# Patient Record
Sex: FEMALE | Race: WHITE | NOT HISPANIC OR LATINO
[De-identification: names, ages, dates, MRNs, and addresses within clinical notes are randomized per-mention and may not be internally consistent; named-entity substitution may affect disease eponyms.]

---

## 2018-07-31 PROBLEM — Z00.00 ENCOUNTER FOR PREVENTIVE HEALTH EXAMINATION: Status: ACTIVE | Noted: 2018-07-31

## 2018-08-08 ENCOUNTER — APPOINTMENT (OUTPATIENT)
Dept: PULMONOLOGY | Facility: CLINIC | Age: 28
End: 2018-08-08
Payer: COMMERCIAL

## 2018-08-08 VITALS
OXYGEN SATURATION: 99 % | TEMPERATURE: 99.1 F | HEIGHT: 65 IN | BODY MASS INDEX: 29.99 KG/M2 | WEIGHT: 180 LBS | SYSTOLIC BLOOD PRESSURE: 119 MMHG | DIASTOLIC BLOOD PRESSURE: 80 MMHG | HEART RATE: 86 BPM | RESPIRATION RATE: 20 BRPM

## 2018-08-08 DIAGNOSIS — G47.33 OBSTRUCTIVE SLEEP APNEA (ADULT) (PEDIATRIC): ICD-10-CM

## 2018-08-08 PROCEDURE — 94010 BREATHING CAPACITY TEST: CPT

## 2018-08-08 PROCEDURE — 99204 OFFICE O/P NEW MOD 45 MIN: CPT | Mod: 25

## 2018-08-22 ENCOUNTER — OUTPATIENT (OUTPATIENT)
Dept: OUTPATIENT SERVICES | Facility: HOSPITAL | Age: 28
LOS: 1 days | End: 2018-08-22
Payer: COMMERCIAL

## 2018-08-22 DIAGNOSIS — G47.30 SLEEP APNEA, UNSPECIFIED: ICD-10-CM

## 2018-08-22 PROCEDURE — 95806 SLEEP STUDY UNATT&RESP EFFT: CPT | Mod: 26

## 2018-08-22 PROCEDURE — 95806 SLEEP STUDY UNATT&RESP EFFT: CPT

## 2018-08-22 PROCEDURE — G0399: CPT

## 2021-09-07 ENCOUNTER — APPOINTMENT (OUTPATIENT)
Dept: ENDOCRINOLOGY | Facility: CLINIC | Age: 31
End: 2021-09-07
Payer: COMMERCIAL

## 2021-09-07 VITALS
WEIGHT: 215 LBS | DIASTOLIC BLOOD PRESSURE: 68 MMHG | OXYGEN SATURATION: 99 % | SYSTOLIC BLOOD PRESSURE: 118 MMHG | BODY MASS INDEX: 35.82 KG/M2 | HEART RATE: 98 BPM | HEIGHT: 65 IN

## 2021-09-07 PROCEDURE — 99204 OFFICE O/P NEW MOD 45 MIN: CPT | Mod: 25

## 2021-09-07 PROCEDURE — 98960 EDU&TRN PT SELF-MGMT NQHP 1: CPT

## 2021-09-07 PROCEDURE — G0108 DIAB MANAGE TRN  PER INDIV: CPT

## 2021-09-07 RX ORDER — CHROMIUM 200 MCG
TABLET ORAL
Refills: 0 | Status: ACTIVE | COMMUNITY

## 2021-09-07 RX ORDER — HYDROXYPROGESTERONE CAPROATE 250 MG/ML
275 INJECTION SUBCUTANEOUS
Refills: 0 | Status: ACTIVE | COMMUNITY

## 2021-09-07 NOTE — CONSULT LETTER
[Dear  ___] : Dear ~RICK, [Consult Letter:] : I had the pleasure of evaluating your patient, [unfilled]. [Consult Closing:] : Thank you very much for allowing me to participate in the care of this patient.  If you have any questions, please do not hesitate to contact me. [Sincerely,] : Sincerely, [FreeTextEntry1] : At the visit with myself and our dietician, we explained impaired glucose tolerance and gestational diabetes in detail giving her printed information as well. We placed her on a gestational diabetic diet restricting her carbohydrate intake to 15 grams at breakfast. She was given a sample diet and instructed on carbohydrate counting. We instructed her on the use of a blood glucose monitor and she was asked to monitor her blood sugars before breakfast and one hour after each meal. \par \par If her blood sugars remain in our target goal of 90 or less fasting and below 120 post- prandially, we will continue to follow her closely on diet management. If, however, her blood sugars rise above these numbers, we will, of course, institute drug therapy.\par \par We have asked her to contact us with her blood glucose numbers in two to three days and to schedule an office follow-up in several weeks. \par \par  [FreeTextEntry3] : Emily Lieberman, FNP-BC

## 2021-09-07 NOTE — ASSESSMENT
[FreeTextEntry1] : 30 y/o female G2G1 with GDM. \par \par Plan:\par GDM: \par - reviewed gestational diabetes risks to patient and fetus\par - start self blood sugar monitoring 4 times a day\par - blood sugar goal for fasting <90 and <120 1 hour meals \par - discussed the potential use of insulin if blood sugars are over goals\par - nursing will perform insulin education \par - send in logs in 3 days\par - will meet with CDE today, to review GDM diet\par - monitor urine ketones\par - check A1C and thyroid function test now\par - follow up visit in 2 weeks\par

## 2021-09-07 NOTE — PHYSICAL EXAM
[Alert] : alert [Well Nourished] : well nourished [No Acute Distress] : no acute distress [Well Developed] : well developed [Normal Sclera/Conjunctiva] : normal sclera/conjunctiva [No Proptosis] : no proptosis [No LAD] : no lymphadenopathy [Thyroid Not Enlarged] : the thyroid was not enlarged [No Thyroid Nodules] : no palpable thyroid nodules [No Respiratory Distress] : no respiratory distress [No Accessory Muscle Use] : no accessory muscle use [Normal Rate and Effort] : normal respiratory rate and effort [Clear to Auscultation] : lungs were clear to auscultation bilaterally [Normal S1, S2] : normal S1 and S2 [Normal Rate] : heart rate was normal [Regular Rhythm] : with a regular rhythm [No Edema] : no peripheral edema [Normal Bowel Sounds] : normal bowel sounds [Normal Gait] : normal gait [No Rash] : no rash [No Tremors] : no tremors [Oriented x3] : oriented to person, place, and time [Normal Affect] : the affect was normal [Normal Insight/Judgement] : insight and judgment were intact [Normal Mood] : the mood was normal [Acanthosis Nigricans] : no acanthosis nigricans [de-identified] : gravid

## 2021-09-07 NOTE — REVIEW OF SYSTEMS
[Fatigue] : fatigue [Recent Weight Gain (___ Lbs)] : recent weight gain: [unfilled] lbs [Constipation] : constipation [Headaches] : headaches [Anxiety] : anxiety [Decreased Appetite] : appetite not decreased [Visual Field Defect] : no visual field defect [Blurred Vision] : no blurred vision [Dysphagia] : no dysphagia [Neck Pain] : no neck pain [Dysphonia] : no dysphonia [Chest Pain] : no chest pain [Palpitations] : no palpitations [Diarrhea] : no diarrhea [Polyuria] : no polyuria [Dysuria] : no dysuria [Tremors] : no tremors [Depression] : no depression [Polydipsia] : no polydipsia [Swelling] : no swelling [FreeTextEntry3] : pain the right with headache [FreeTextEntry7] : sometimes  [de-identified] : dry mouth

## 2021-09-07 NOTE — HISTORY OF PRESENT ILLNESS
[FreeTextEntry1] : Quality:  GDM \par Severity: moderate \par Onset: 27 weeks gestation \par Glucose tolerance test results: 2 hour gtt \par History of PCOS: Yes, was treated with Metformin\par Previous history of GDM: No, 1st child took Metformin \par Family History: Possibly biological father with diabetes\par \par Self blood sugar monitoring: none \par current \par \par \par Notes: Obstetrician: [facility] \par  IVF with same sex partner with partner egg  \par Continues to receive Progesterone injections weekly \par \par 1st child 2020 weighed 6 lbs 2 oz, vaginal birth (boy)\par LMP: 21\par EDC: 2021\par currently 27.5 weeks gestation\par Last sonogram: a couple weeks: normal (boy)\par \par Planning to breast feed: Yes\par \par \par PMH: Fibromyalgia

## 2021-09-09 RX ORDER — LANCETS 30 GAUGE
EACH MISCELLANEOUS
Qty: 400 | Refills: 1 | Status: ACTIVE | COMMUNITY
Start: 2021-09-09 | End: 1900-01-01

## 2021-09-09 RX ORDER — BLOOD SUGAR DIAGNOSTIC
STRIP MISCELLANEOUS
Qty: 4 | Refills: 1 | Status: ACTIVE | COMMUNITY
Start: 2021-09-09 | End: 1900-01-01

## 2021-09-10 RX ORDER — BLOOD SUGAR DIAGNOSTIC
STRIP MISCELLANEOUS
Qty: 400 | Refills: 0 | Status: ACTIVE | COMMUNITY
Start: 2021-09-10 | End: 1900-01-01

## 2021-09-10 RX ORDER — LANCETS 28 GAUGE
EACH MISCELLANEOUS
Qty: 4 | Refills: 0 | Status: ACTIVE | COMMUNITY
Start: 2021-09-10 | End: 1900-01-01

## 2021-09-10 RX ORDER — BLOOD-GLUCOSE METER
W/DEVICE KIT MISCELLANEOUS
Qty: 1 | Refills: 0 | Status: ACTIVE | COMMUNITY
Start: 2021-09-10 | End: 1900-01-01

## 2021-09-16 ENCOUNTER — APPOINTMENT (OUTPATIENT)
Dept: ENDOCRINOLOGY | Facility: CLINIC | Age: 31
End: 2021-09-16
Payer: COMMERCIAL

## 2021-09-16 PROCEDURE — 99214 OFFICE O/P EST MOD 30 MIN: CPT | Mod: 95

## 2021-09-16 NOTE — HISTORY OF PRESENT ILLNESS
[Home] : at home, [unfilled] , at the time of the visit. [Medical Office: ([facility])___] : at the medical office located in  [Verbal consent obtained from patient] : the patient, [unfilled] [FreeTextEntry1] : Quality:  GDM \par Severity: moderate \par Onset: 27 weeks gestation \par Glucose tolerance test results: 2 hour gtt \par History of PCOS: Yes, was treated with Metformin\par Previous history of GDM: No, 1st child took Metformin \par Family History: Possibly biological father with diabetes\par \par Self blood sugar monitorin times a day\par before breakfast 85, 88, 87, 74, 97, 52, 97\par after breakfast 129, 144 (2 eggs and 1.5 protein waffles), 104, 100, 102, 122, 114\par after lunch 123, 112, 121, 123, 132, 87, 151(chicken wrap with light sour cream) 118\par after dinner 112, 131, 127, 94, 104, 112, 106\par this morning 89\par Ketones moderate and large \par \par \par Notes: Obstetrician: [facility] \par  IVF with same sex partner with partner egg  \par Continues to receive Progesterone injections weekly \par \par 1st child 2020 weighed 6 lbs 2 oz, vaginal birth (boy)\par LMP: 21\par EDC: 2021\par currently 29 weeks gestation\par Last sonogram: last night: weighed 3 lbs 10 oz, 80th percentile, fluid 13 (boy)\par \par Planning to breast feed: Yes\par \par \par PMH: Fibromyalgia

## 2021-09-16 NOTE — ASSESSMENT
[FreeTextEntry1] : 30 y/o female G2G1 with GDM. \par \par Plan:\par GDM: \par - start Humalog to 3 units before lunch \par - increase carbs at bedtime to prevent urine ketones in morning \par - continue self blood sugar monitoring 4 times a day\par - blood sugar goal for fasting <90 and <120 1 hour meals \par - send in logs in on Monday \par - monitor urine ketones\par - follow up visit in 1 week\par \par \par Start Time: 3:30\par End Time: 4:00

## 2021-09-21 ENCOUNTER — NON-APPOINTMENT (OUTPATIENT)
Age: 31
End: 2021-09-21

## 2021-09-21 RX ORDER — PEN NEEDLE, DIABETIC 29 G X1/2"
32G X 4 MM NEEDLE, DISPOSABLE MISCELLANEOUS
Qty: 4 | Refills: 0 | Status: ACTIVE | COMMUNITY
Start: 2021-09-17 | End: 1900-01-01

## 2021-09-24 ENCOUNTER — APPOINTMENT (OUTPATIENT)
Dept: ENDOCRINOLOGY | Facility: CLINIC | Age: 31
End: 2021-09-24
Payer: COMMERCIAL

## 2021-09-24 PROCEDURE — 99213 OFFICE O/P EST LOW 20 MIN: CPT | Mod: 95

## 2021-09-24 RX ORDER — PROGESTERONE 50 MG/ML
50 INJECTION, SOLUTION INTRAMUSCULAR
Qty: 20 | Refills: 0 | Status: ACTIVE | COMMUNITY
Start: 2021-02-22

## 2021-09-24 RX ORDER — PEN NEEDLE, DIABETIC 29 G X1/2"
32G X 4 MM NEEDLE, DISPOSABLE MISCELLANEOUS
Qty: 2 | Refills: 1 | Status: ACTIVE | COMMUNITY
Start: 2021-09-24 | End: 1900-01-01

## 2021-09-24 RX ORDER — INSULIN HUMAN 100 [IU]/ML
100 INJECTION, SUSPENSION SUBCUTANEOUS AT BEDTIME
Qty: 1 | Refills: 0 | Status: ACTIVE | COMMUNITY
Start: 2021-09-24 | End: 1900-01-01

## 2021-09-24 NOTE — HISTORY OF PRESENT ILLNESS
[Medical Office: ([facility])___] : at the medical office located in  [Verbal consent obtained from patient] : the patient, [unfilled] [Other Location: e.g. School (Enter Location, City,State)___] : at [unfilled], at the time of the visit. [FreeTextEntry1] : Quality:  GDM \par Severity: moderate \par Onset: 27 weeks gestation \par Glucose tolerance test results: 2 hour gtt \par History of PCOS: Yes, was treated with Metformin\par Previous history of GDM: No, 1st child took Metformin \par Family History: Possibly biological father with diabetes\par \par Current Medication Regimen: \par Humalog 3 units before lunch \par \par Self blood sugar monitorin times a day\par before breakfast 98, 62, 91, 89\par after breakfast 110, 118, 118, 122, 99\par after lunch 127, 113, 124, 87, 100, 113, 111\par after dinner 130, 89, 107, 126\par this morning 96\par Ketones small to trace\par \par \par Notes: Obstetrician: [facility] \par  IVF with same sex partner with partner egg  \par Continues to receive Progesterone injections weekly \par \par 1st child 2020 weighed 6 lbs 2 oz, vaginal birth (boy)\par LMP: 21\par EDC: 2021\par currently 30 weeks gestation\par Last sonogram: last night: weighed 3 lbs 10 oz, 80th percentile, fluid 13 (boy)\par \par Planning to breast feed: Yes\par \par \par PMH: Fibromyalgia

## 2021-09-24 NOTE — REVIEW OF SYSTEMS
[Recent Weight Loss (___ Lbs)] : recent weight loss: [unfilled] lbs [Fatigue] : no fatigue [Decreased Appetite] : appetite not decreased [Recent Weight Gain (___ Lbs)] : no recent weight gain [Visual Field Defect] : no visual field defect [Blurred Vision] : no blurred vision [Dysphagia] : no dysphagia [Neck Pain] : no neck pain [Dysphonia] : no dysphonia [Chest Pain] : no chest pain [Constipation] : no constipation [Palpitations] : no palpitations [Diarrhea] : no diarrhea [Polyuria] : no polyuria [Dysuria] : no dysuria [Polydipsia] : no polydipsia [FreeTextEntry2] : w

## 2021-09-24 NOTE — ASSESSMENT
[FreeTextEntry1] : 32 y/o female G2G1 with GDM. \par \par Plan:\par GDM: \par - start Humulin N 3 units at bedtime \par - continue Humalog 3 units before lunch\par - continue carbs at bedtime to prevent urine ketones in morning \par - continue self blood sugar monitoring 4 times a day\par - blood sugar goal for fasting <90 and <120 1 hour meals \par - send in logs in on Monday \par - monitor urine ketones\par - follow up visit in 2 week\par \par \par Start Time: 3:34\par End Time: 3:54

## 2021-09-27 ENCOUNTER — EMERGENCY (EMERGENCY)
Facility: HOSPITAL | Age: 31
LOS: 1 days | Discharge: HOME | End: 2021-09-27
Attending: EMERGENCY MEDICINE
Payer: COMMERCIAL

## 2021-09-27 ENCOUNTER — OUTPATIENT (OUTPATIENT)
Dept: OUTPATIENT SERVICES | Facility: HOSPITAL | Age: 31
LOS: 1 days | End: 2021-09-27
Payer: COMMERCIAL

## 2021-09-27 VITALS — HEART RATE: 95 BPM | DIASTOLIC BLOOD PRESSURE: 69 MMHG | SYSTOLIC BLOOD PRESSURE: 108 MMHG

## 2021-09-27 VITALS
TEMPERATURE: 99 F | SYSTOLIC BLOOD PRESSURE: 113 MMHG | OXYGEN SATURATION: 98 % | HEIGHT: 65 IN | DIASTOLIC BLOOD PRESSURE: 75 MMHG | HEART RATE: 97 BPM | RESPIRATION RATE: 16 BRPM | WEIGHT: 214.07 LBS

## 2021-09-27 VITALS — HEART RATE: 92 BPM | OXYGEN SATURATION: 99 %

## 2021-09-27 DIAGNOSIS — O26.899 OTHER SPECIFIED PREGNANCY RELATED CONDITIONS, UNSPECIFIED TRIMESTER: ICD-10-CM

## 2021-09-27 DIAGNOSIS — Z3A.00 WEEKS OF GESTATION OF PREGNANCY NOT SPECIFIED: ICD-10-CM

## 2021-09-27 DIAGNOSIS — Z98.890 OTHER SPECIFIED POSTPROCEDURAL STATES: Chronic | ICD-10-CM

## 2021-09-27 LAB
ANION GAP SERPL CALC-SCNC: 15 MMOL/L — SIGNIFICANT CHANGE UP (ref 5–17)
APPEARANCE UR: CLEAR — SIGNIFICANT CHANGE UP
BASOPHILS # BLD AUTO: 0.04 K/UL — SIGNIFICANT CHANGE UP (ref 0–0.2)
BASOPHILS NFR BLD AUTO: 0.5 % — SIGNIFICANT CHANGE UP (ref 0–2)
BILIRUB UR-MCNC: NEGATIVE — SIGNIFICANT CHANGE UP
BUN SERPL-MCNC: 9 MG/DL — SIGNIFICANT CHANGE UP (ref 7–23)
CALCIUM SERPL-MCNC: 8.6 MG/DL — SIGNIFICANT CHANGE UP (ref 8.4–10.5)
CHLORIDE SERPL-SCNC: 105 MMOL/L — SIGNIFICANT CHANGE UP (ref 96–108)
CO2 SERPL-SCNC: 18 MMOL/L — LOW (ref 22–31)
COLOR SPEC: COLORLESS — SIGNIFICANT CHANGE UP
CREAT SERPL-MCNC: 0.41 MG/DL — LOW (ref 0.5–1.3)
DIFF PNL FLD: NEGATIVE — SIGNIFICANT CHANGE UP
EOSINOPHIL # BLD AUTO: 0.08 K/UL — SIGNIFICANT CHANGE UP (ref 0–0.5)
EOSINOPHIL NFR BLD AUTO: 0.9 % — SIGNIFICANT CHANGE UP (ref 0–6)
GLUCOSE BLDC GLUCOMTR-MCNC: 72 MG/DL — SIGNIFICANT CHANGE UP (ref 70–99)
GLUCOSE SERPL-MCNC: 65 MG/DL — LOW (ref 70–99)
GLUCOSE UR QL: NEGATIVE — SIGNIFICANT CHANGE UP
HCT VFR BLD CALC: 38.4 % — SIGNIFICANT CHANGE UP (ref 34.5–45)
HGB BLD-MCNC: 12.8 G/DL — SIGNIFICANT CHANGE UP (ref 11.5–15.5)
IMM GRANULOCYTES NFR BLD AUTO: 0.5 % — SIGNIFICANT CHANGE UP (ref 0–1.5)
KETONES UR-MCNC: NEGATIVE — SIGNIFICANT CHANGE UP
LEUKOCYTE ESTERASE UR-ACNC: ABNORMAL
LYMPHOCYTES # BLD AUTO: 1.35 K/UL — SIGNIFICANT CHANGE UP (ref 1–3.3)
LYMPHOCYTES # BLD AUTO: 15.7 % — SIGNIFICANT CHANGE UP (ref 13–44)
MAGNESIUM SERPL-MCNC: 2.1 MG/DL — SIGNIFICANT CHANGE UP (ref 1.6–2.6)
MCHC RBC-ENTMCNC: 28.7 PG — SIGNIFICANT CHANGE UP (ref 27–34)
MCHC RBC-ENTMCNC: 33.3 GM/DL — SIGNIFICANT CHANGE UP (ref 32–36)
MCV RBC AUTO: 86.1 FL — SIGNIFICANT CHANGE UP (ref 80–100)
MONOCYTES # BLD AUTO: 0.59 K/UL — SIGNIFICANT CHANGE UP (ref 0–0.9)
MONOCYTES NFR BLD AUTO: 6.9 % — SIGNIFICANT CHANGE UP (ref 2–14)
NEUTROPHILS # BLD AUTO: 6.5 K/UL — SIGNIFICANT CHANGE UP (ref 1.8–7.4)
NEUTROPHILS NFR BLD AUTO: 75.5 % — SIGNIFICANT CHANGE UP (ref 43–77)
NITRITE UR-MCNC: NEGATIVE — SIGNIFICANT CHANGE UP
NRBC # BLD: 0 /100 WBCS — SIGNIFICANT CHANGE UP (ref 0–0)
PH UR: 7 — SIGNIFICANT CHANGE UP (ref 5–8)
PHOSPHATE SERPL-MCNC: 2.7 MG/DL — SIGNIFICANT CHANGE UP (ref 2.5–4.5)
PLATELET # BLD AUTO: 294 K/UL — SIGNIFICANT CHANGE UP (ref 150–400)
POTASSIUM SERPL-MCNC: 3.7 MMOL/L — SIGNIFICANT CHANGE UP (ref 3.5–5.3)
POTASSIUM SERPL-SCNC: 3.7 MMOL/L — SIGNIFICANT CHANGE UP (ref 3.5–5.3)
PROT UR-MCNC: NEGATIVE — SIGNIFICANT CHANGE UP
RBC # BLD: 4.46 M/UL — SIGNIFICANT CHANGE UP (ref 3.8–5.2)
RBC # FLD: 13.2 % — SIGNIFICANT CHANGE UP (ref 10.3–14.5)
SODIUM SERPL-SCNC: 138 MMOL/L — SIGNIFICANT CHANGE UP (ref 135–145)
SP GR SPEC: 1.01 — LOW (ref 1.01–1.02)
UROBILINOGEN FLD QL: NEGATIVE — SIGNIFICANT CHANGE UP
WBC # BLD: 8.6 K/UL — SIGNIFICANT CHANGE UP (ref 3.8–10.5)
WBC # FLD AUTO: 8.6 K/UL — SIGNIFICANT CHANGE UP (ref 3.8–10.5)

## 2021-09-27 PROCEDURE — 99284 EMERGENCY DEPT VISIT MOD MDM: CPT

## 2021-09-27 PROCEDURE — 82962 GLUCOSE BLOOD TEST: CPT

## 2021-09-27 PROCEDURE — 99213 OFFICE O/P EST LOW 20 MIN: CPT

## 2021-09-27 PROCEDURE — 83735 ASSAY OF MAGNESIUM: CPT

## 2021-09-27 PROCEDURE — 84100 ASSAY OF PHOSPHORUS: CPT

## 2021-09-27 PROCEDURE — 99283 EMERGENCY DEPT VISIT LOW MDM: CPT

## 2021-09-27 PROCEDURE — 87077 CULTURE AEROBIC IDENTIFY: CPT

## 2021-09-27 PROCEDURE — 81001 URINALYSIS AUTO W/SCOPE: CPT

## 2021-09-27 PROCEDURE — 59025 FETAL NON-STRESS TEST: CPT

## 2021-09-27 PROCEDURE — 87086 URINE CULTURE/COLONY COUNT: CPT

## 2021-09-27 PROCEDURE — 80048 BASIC METABOLIC PNL TOTAL CA: CPT

## 2021-09-27 PROCEDURE — G0463: CPT

## 2021-09-27 PROCEDURE — 85025 COMPLETE CBC W/AUTO DIFF WBC: CPT

## 2021-09-27 RX ORDER — NITROFURANTOIN MACROCRYSTAL 50 MG
1 CAPSULE ORAL
Qty: 14 | Refills: 0
Start: 2021-09-27 | End: 2021-10-03

## 2021-09-27 NOTE — OB PROVIDER TRIAGE NOTE - NSHPPHYSICALEXAM_GEN_ALL_CORE
Vital Signs Last 24 Hrs  T(C): 37 (27 Sep 2021 12:18), Max: 37.1 (27 Sep 2021 10:56)  T(F): 98.6 (27 Sep 2021 12:18), Max: 98.8 (27 Sep 2021 10:56)  HR: 95 (27 Sep 2021 15:41) (84 - 97)  BP: 108/69 (27 Sep 2021 15:41) (99/55 - 113/75)  RR: 18 (27 Sep 2021 12:18) (16 - 18)  SpO2: 99% (27 Sep 2021 13:55) (98% - 100%)    /mod/+accel/-decel  Dearborn irritable  BSS: VTX, posterior, MVP > 4    Gen: awake, alert, NAD  Chest: nonlabored breathing  Abd: soft, gravid, nontender  : NEFG  LE: nontender  MSK: +5/5 LE strength

## 2021-09-27 NOTE — ED PROVIDER NOTE - ATTENDING CONTRIBUTION TO CARE
RGUJRAL 32yo f hx  currently 30 weeks pregnant with IVF, gestational DM BIB EMS for lower back pain and leg heaviness. States symptoms are similar to her prior pregnancy w  labor. Denies any HA, chest/abd pain. Pt complains of lower back pain with b/l thigh heaviness and numbness. Pt was ambulatory, no bladder or stool incontinence. Denies any trauma or fever, chills. Pt felt lightheaded w symptoms that has since improved. No dysuria, frequency.   On exam, Patient is awake, alert and oriented x 3.  Patient is well appearing and in no acute distress.  NCAT, PERRL, EOMI.  Neck is supple, No LAD.  Lungs are CTA B/L,+S1S2 no murmurs,  Abdomen:Soft gravid non tender +bs no rebound or guarding.  No midline T/L tenderness.   Extremity no edema or calf tender.  Skin with no rash.  Neuro CN3-12 intact. Strength 5/5 in upper and lower extremities. Nml Sensation.Gait normal.   Pt denies any vaginal bleeding or leakage of fluids. Pt neuro intact, concern for  labor. Spoke to OB resident and Charge RN, will transport patient directly to L and D.

## 2021-09-27 NOTE — ED PROVIDER NOTE - OBJECTIVE STATEMENT
31 y.o. female  20 weeks pregnant had pre ter labor with her prior pregnancy and is currently being monitored for that with this pregnancy getting injections to prevent it ocming in feeling lightheaded, having lower back pain and numbness in her lower extremities.  NO belly pain, no contractions.  NO cough , no fevers, no chills.  No weakness in LE's, no dysuria, frequency, vaginal bleeding or rush of fluid from the vagina. 31 y.o. female  30 weeks pregnant had pre ter labor with her prior pregnancy and is currently being monitored for that with this pregnancy getting injections to prevent it ocming in feeling lightheaded, having lower back pain and numbness in her lower extremities.  NO belly pain, no contractions.  NO cough , no fevers, no chills.  No weakness in LE's, no dysuria, frequency, vaginal bleeding or rush of fluid from the vagina.

## 2021-09-27 NOTE — OB PROVIDER TRIAGE NOTE - NSOBPROVIDERNOTE_OBGYN_ALL_OB_FT
30yo  @ 30w4d PNC IVF, gDMA2 p/w c/o weakness.   - CBC, BMP, UA  - NST    d/w Dr. Mary Hankins R3 30yo  @ 30w4d PNC IVF, gDMA2 p/w c/o weakness.   - CBC, BMP, UA  - NST    d/w Dr. Mary Hankins R3    @ 1400  - NST reactive: 140/mod/+accel/-decel  - CBC/BMP pending    @1600  - CBC/BMP normal  - UA w/ poss UTI -> send for Cx, empiric Rx w/Macrobid  - c/w FS, f/u w/OB as scheduled  - Return precautions reviewed    d/w Dr. Mary Hankins R3

## 2021-09-27 NOTE — OB PROVIDER TRIAGE NOTE - NS_OBGYNHISTORY_OBGYN_ALL_OB_FT
PNC: [facility]. gDMA2. No reported genetic/sono abnl. EFW 2wk ago 80%    OBHx:  G1 PPROM @ 35w s/p   G2 current    GYNHx: h/o PCOS; denies STIs, abnl Pap smears, fibroids

## 2021-09-27 NOTE — OB PROVIDER TRIAGE NOTE - NSHPLABSRESULTS_GEN_ALL_CORE
12.8   8.60  )-----------( 294      ( 09-27 @ 14:40 )             38.4     09-27 @ 14:40    138  |  105  |  9   ----------------------------<  65  3.7   |  18  |  0.41    Ca    8.6      09-27 @ 14:40  Phos  2.7     09-27 @ 14:40  Mg     2.1     09-27 @ 14:40    CAPILLARY BLOOD GLUCOSE  POCT Blood Glucose.: 72 mg/dL (27 Sep 2021 13:42)  POCT Blood Glucose.: 84 mg/dL (27 Sep 2021 11:09)  POCT Blood Glucose.: 83 mg/dL (27 Sep 2021 10:58)

## 2021-09-28 ENCOUNTER — APPOINTMENT (OUTPATIENT)
Dept: ENDOCRINOLOGY | Facility: CLINIC | Age: 31
End: 2021-09-28
Payer: COMMERCIAL

## 2021-09-28 PROCEDURE — 99212 OFFICE O/P EST SF 10 MIN: CPT | Mod: 95

## 2021-09-28 NOTE — HISTORY OF PRESENT ILLNESS
[FreeTextEntry1] : Pt went to ER yesterday due to fainting at work. She is a  and there was a fire drill, Pt felt like passing out in the hallway and needed to lay on ground. She checked her sugar and it was 90. Ambulance was called and she was in [facility] for observation. She was found to have +UTI. during her ER stay sugar dropped to 60, but did not eat for 6 hours, as they would not let her. Pre-Term labor was ruled out. Earlier in the day she had urinary hesitation. \par Patient has OB visit this afternoon\par \par Quality: GDM \par Severity: moderate \par Onset: 27 weeks gestation \par Glucose tolerance test results: 2 hour gtt \par History of PCOS: Yes, was treated with Metformin\par Previous history of GDM: No, 1st child took Metformin \par Family History: Possibly biological father with diabetes\par \par Current Medication Regimen: \par Humalog 3 units before lunch \par Humulin N 3 units before bed\par \par Self blood sugar monitorin times a day\par \par \par Notes: Obstetrician: [facility] \par  IVF with same sex partner with partner egg \par Continues to receive Progesterone injections weekly \par \par 1st child 2020 weighed 6 lbs 2 oz, vaginal birth (boy)\par LMP: 21\par EDC: 2021\par currently 31 weeks gestation on Thursday \par Last sonogram: last night: weighed 3 lbs 10 oz, 80th percentile, fluid 13 (boy)\par \par Planning to breast feed: Yes\par \par \par PMH: Fibromyalgia

## 2021-09-28 NOTE — ASSESSMENT
[FreeTextEntry1] : \par 30 y/o female G2G1 with GDM. \par \par Plan:\par GDM: \par - Cotninue Humulin N 3 units at bedtime \par - continue Humalog 3 units before lunch\par - continue carbs at bedtime to prevent urine ketones in morning \par - continue self blood sugar monitoring 4 times a day\par - blood sugar goal for fasting <90 and <120 1 hour meals \par - send in logs in on Monday \par - monitor urine ketones\par - follow up visit in 2 week\par -If feels lightheaded before morning snack, may need to increase carbs in breakfast \par \par \par Start Time: 3:26\par End Time: 3:42\par

## 2021-09-28 NOTE — REASON FOR VISIT
[Home] : at home, [unfilled] , at the time of the visit. [Medical Office: ([facility])___] : at the medical office located in  [Verbal consent obtained from patient] : the patient, [unfilled]

## 2021-09-28 NOTE — REVIEW OF SYSTEMS
[Fatigue] : no fatigue [Visual Field Defect] : no visual field defect [Blurred Vision] : no blurred vision [Dysphagia] : no dysphagia [Neck Pain] : no neck pain [Chest Pain] : no chest pain [Palpitations] : no palpitations [Shortness Of Breath] : no shortness of breath [Nausea] : no nausea [Constipation] : no constipation [Vomiting] : no vomiting [Diarrhea] : no diarrhea [Polyuria] : polyuria [Polydipsia] : no polydipsia [FreeTextEntry8] : urine hesistancy

## 2021-09-29 LAB
CULTURE RESULTS: SIGNIFICANT CHANGE UP
SPECIMEN SOURCE: SIGNIFICANT CHANGE UP

## 2021-10-05 RX ORDER — URINE ACETONE TEST STRIPS
STRIP MISCELLANEOUS
Qty: 100 | Refills: 0 | Status: ACTIVE | COMMUNITY
Start: 2021-09-09 | End: 1900-01-01

## 2021-10-11 ENCOUNTER — APPOINTMENT (OUTPATIENT)
Dept: ENDOCRINOLOGY | Facility: CLINIC | Age: 31
End: 2021-10-11
Payer: COMMERCIAL

## 2021-10-11 VITALS
HEART RATE: 94 BPM | BODY MASS INDEX: 35.32 KG/M2 | SYSTOLIC BLOOD PRESSURE: 110 MMHG | HEIGHT: 65 IN | WEIGHT: 212 LBS | OXYGEN SATURATION: 98 % | DIASTOLIC BLOOD PRESSURE: 70 MMHG

## 2021-10-11 PROBLEM — M41.9 SCOLIOSIS, UNSPECIFIED: Chronic | Status: ACTIVE | Noted: 2021-09-27

## 2021-10-11 LAB — GLUCOSE BLDC GLUCOMTR-MCNC: 109

## 2021-10-11 PROCEDURE — 82962 GLUCOSE BLOOD TEST: CPT

## 2021-10-11 PROCEDURE — 99213 OFFICE O/P EST LOW 20 MIN: CPT | Mod: 25

## 2021-10-11 NOTE — ASSESSMENT
[FreeTextEntry1] : 32 y/o female G2G1 with GDM. \par \par Plan:\par GDM: \par - continue current Rx\par - continue carbs at bedtime to prevent urine ketones in the morning \par - continue self blood sugar monitoring 4 times a day\par - blood sugar goal for fasting <90 and <120 1 hour meals \par - send in logs on Thursday\par - monitor urine ketones\par - follow up visit in 2 week\par

## 2021-10-11 NOTE — PHYSICAL EXAM
[Alert] : alert [Well Nourished] : well nourished [No Acute Distress] : no acute distress [Well Developed] : well developed [Normal Sclera/Conjunctiva] : normal sclera/conjunctiva [No Proptosis] : no proptosis [No LAD] : no lymphadenopathy [Thyroid Not Enlarged] : the thyroid was not enlarged [No Thyroid Nodules] : no palpable thyroid nodules [No Respiratory Distress] : no respiratory distress [No Accessory Muscle Use] : no accessory muscle use [Normal Rate and Effort] : normal respiratory rate and effort [Clear to Auscultation] : lungs were clear to auscultation bilaterally [Normal S1, S2] : normal S1 and S2 [Normal Rate] : heart rate was normal [Regular Rhythm] : with a regular rhythm [No Edema] : no peripheral edema [Normal Bowel Sounds] : normal bowel sounds [Normal Gait] : normal gait [No Rash] : no rash [No Tremors] : no tremors [Oriented x3] : oriented to person, place, and time [Normal Affect] : the affect was normal [Normal Insight/Judgement] : insight and judgment were intact [Normal Mood] : the mood was normal [Acanthosis Nigricans] : no acanthosis nigricans [de-identified] : gravid

## 2021-10-11 NOTE — HISTORY OF PRESENT ILLNESS
[FreeTextEntry1] : Quality:  GDM \par Severity: moderate \par Onset: 27 weeks gestation \par Glucose tolerance test results: 2 hour gtt \par History of PCOS: Yes, was treated with Metformin\par Previous history of GDM: No, 1st child took Metformin \par Family History: Possibly biological father with diabetes\par \par Current Medication Regimen: \par Novolog 3 units before lunch \par Humulin N N 3 units at bedtime \par \par Self blood sugar monitorin times a day\par before breakfast  92, 89, 77, 113, 96\par after breakfast 109, 117, 95, 137, 100\par after lunch 93, 98, 111, 119\par after dinner 107,103, 112, 107, 108\par this morning 89\par Ketones Trace\par \par \par Notes: Obstetrician: [facility] \par  IVF with same sex partner with partner egg  \par Continues to receive Progesterone injections weekly \par \par 1st child 2020 weighed 6 lbs 2 oz, vaginal birth (boy)\par LMP: 21\par EDC: 2021\par currently 32  weeks gestation\par Last sonogram: last night: weighed 3 lbs 10 oz, 80th percentile, fluid 13 (boy)\par \par Planning to breast feed: Yes\par \par \par PMH: Fibromyalgia

## 2021-10-11 NOTE — REVIEW OF SYSTEMS
[Constipation] : constipation [Heat Intolerance] : heat intolerance [Negative] : Eyes [Fatigue] : no fatigue [Decreased Appetite] : appetite not decreased [Recent Weight Gain (___ Lbs)] : no recent weight gain [Recent Weight Loss (___ Lbs)] : no recent weight loss [Fever] : no fever [Chills] : no chills [Dysphagia] : no dysphagia [Neck Pain] : no neck pain [Dysphonia] : no dysphonia [Chest Pain] : no chest pain [Palpitations] : no palpitations [Shortness Of Breath] : no shortness of breath [Cough] : no cough [Nausea] : no nausea [Vomiting] : no vomiting [Diarrhea] : no diarrhea [Polyuria] : no polyuria [Dysuria] : no dysuria [Incontinence] : no incontinence [Joint Pain] : no joint pain [Muscle Weakness] : no muscle weakness [Dry Skin] : no dry skin [Hair Loss] : no hair loss [Headaches] : no headaches [Tremors] : no tremors [Anxiety] : no anxiety [Depression] : no depression [Polydipsia] : no polydipsia [Cold Intolerance] : no cold intolerance [Easy Bleeding] : no ~M tendency for easy bleeding [Easy Bruising] : no tendency for easy bruising [de-identified] : pregnancy related

## 2021-11-01 ENCOUNTER — NON-APPOINTMENT (OUTPATIENT)
Age: 31
End: 2021-11-01

## 2021-11-07 ENCOUNTER — RX RENEWAL (OUTPATIENT)
Age: 31
End: 2021-11-07

## 2021-11-07 RX ORDER — INSULIN LISPRO 100 [IU]/ML
100 INJECTION, SOLUTION INTRAVENOUS; SUBCUTANEOUS
Qty: 15 | Refills: 0 | Status: ACTIVE | COMMUNITY
Start: 2021-09-24 | End: 1900-01-01

## 2021-11-09 ENCOUNTER — NON-APPOINTMENT (OUTPATIENT)
Age: 31
End: 2021-11-09

## 2021-11-11 ENCOUNTER — APPOINTMENT (OUTPATIENT)
Dept: ENDOCRINOLOGY | Facility: CLINIC | Age: 31
End: 2021-11-11
Payer: COMMERCIAL

## 2021-11-11 PROCEDURE — 99212 OFFICE O/P EST SF 10 MIN: CPT | Mod: 95

## 2021-11-11 PROCEDURE — 99202 OFFICE O/P NEW SF 15 MIN: CPT | Mod: 95

## 2021-11-11 NOTE — HISTORY OF PRESENT ILLNESS
[Home] : at home, [unfilled] , at the time of the visit. [Medical Office: ([facility])___] : at the medical office located in  [Verbal consent obtained from patient] : the patient, [unfilled] [FreeTextEntry1] : Quality: GDM \par Severity: moderate \par Onset: 27 weeks gestation \par Glucose tolerance test results: 2 hour gtt \par History of PCOS: Yes, was treated with Metformin\par Previous history of GDM: No, 1st child took Metformin \par Family History: Possibly biological father with diabetes\par \par Self blood sugar monitorin times a day\par before breakfast 92, x, 87\par after breakfast 98, 110, 119\par after lunch 107, 130, 126\par after dinner 130, 125\par \par Ketones trace, small, trace\par \par Current doses\par 3 units of Humalog with breakfast\par 6 units of Humalog lunch and dinner\par 5 units of Humulin N \par \par Notes: Obstetrician: [facility] \par  IVF with same sex partner with partner egg \par Continues to receive Progesterone injections weekly \par \par 1st child 2020 weighed 6 lbs 2 oz, vaginal birth (boy)\par LMP: 21\par EDC: 2021\par currently 37 weeks gestation\par Last sonogram: weighed 6 lbs 13 oz, 57th percentile, fluid 10(boy) - 3 cm dilated\par \par Planning to breast feed: Yes\par \par PMH: Fibromyalgia \par

## 2021-11-11 NOTE — ASSESSMENT
[FreeTextEntry1] : GDM: \par - increase lunch and dinner Humalog to 8 units\par -no changes to humulin N or breakfast Humalog dosing\par -Pt is 3 cm dilated, will likely deliver soon. She is aware she can stop FS and insulin injections once baby is born. She will follow up 6 weeks post partum. If she does not deliver by MOnday, she is aware to send logs again.\par - continue carbs at bedtime to prevent urine ketones in the morning \par - continue self blood sugar monitoring 4 times a day\par

## 2021-11-22 ENCOUNTER — APPOINTMENT (OUTPATIENT)
Dept: ENDOCRINOLOGY | Facility: CLINIC | Age: 31
End: 2021-11-22
Payer: COMMERCIAL

## 2021-11-22 DIAGNOSIS — O24.419 GESTATIONAL DIABETES MELLITUS IN PREGNANCY, UNSPECIFIED CONTROL: ICD-10-CM

## 2021-11-22 PROCEDURE — 99214 OFFICE O/P EST MOD 30 MIN: CPT | Mod: 95

## 2021-11-22 NOTE — ASSESSMENT
[FreeTextEntry1] : GDM: blood sugars dropping - called [facility] and left a message. Recommend patient have a sonogram and biophysical to measure growth of fetus. \par - continue current Rx \par - send in logs in 2 days\par -Pt is 3 cm dilated. She is aware she can stop FS and insulin injections once baby is born. She will follow up 6 weeks post partum. \par - continue carbs at bedtime to prevent urine ketones in the morning \par - continue self blood sugar monitoring 4 times a day\par \par Start Time: 3:00\par End Time: 3: 30\par

## 2021-11-22 NOTE — REVIEW OF SYSTEMS
[Fatigue] : fatigue [Decreased Appetite] : appetite not decreased [Recent Weight Gain (___ Lbs)] : recent weight gain: [unfilled] lbs [Visual Field Defect] : no visual field defect [Blurred Vision] : no blurred vision [Dysphagia] : no dysphagia [Neck Pain] : no neck pain [Dysphonia] : no dysphonia [Chest Pain] : no chest pain [Palpitations] : no palpitations [Constipation] : constipation [Diarrhea] : no diarrhea [Polyuria] : no polyuria [Dysuria] : no dysuria [Headaches] : no headaches [Tremors] : no tremors [Polydipsia] : no polydipsia

## 2021-11-22 NOTE — HISTORY OF PRESENT ILLNESS
[FreeTextEntry1] : Quality: GDM \par Severity: moderate \par Onset: 27 weeks gestation \par Glucose tolerance test results: 2 hour gtt \par History of PCOS: Yes, was treated with Metformin\par Previous history of GDM: No, 1st child took Metformin \par Family History: Possibly biological father with diabetes\par \par Self blood sugar monitorin times a day\par before breakfast 69, 69, 69 (Atkins and grapes for bedtime snack)\par after breakfast 95, 97, 108, 111\par after lunch 114, 117, 92, 97\par after dinner 97, 100, 116\par \par Ketones negative/ trace\par \par Current doses\par 3 units of Humalog with breakfast\par 8 units of Humalog lunch and dinner\par 5 units of Humulin N \par \par Notes: Obstetrician: [facility] \par  IVF with same sex partner with partner egg \par Continues to receive Progesterone injections weekly \par \par 1st child 2020 weighed 6 lbs 2 oz, vaginal birth (boy)\par LMP: 21\par EDC: 2021\par currently 38.5 weeks gestation\par Last sonogram: 2 weeks ago, weighed 6 lbs 13 oz, 57th percentile, fluid 10(boy) - 3 cm dilated\par \par Planning to breast feed: Yes\par \par PMH: Fibromyalgia \par   [Home] : at home, [unfilled] , at the time of the visit. [Medical Office: ([facility])___] : at the medical office located in  [Verbal consent obtained from patient] : the patient, [unfilled]

## 2021-11-22 NOTE — PHYSICAL EXAM
[Alert] : alert [No Acute Distress] : no acute distress [de-identified] : Physical exam deferred due to telehealth visit

## 2021-12-12 ENCOUNTER — RX RENEWAL (OUTPATIENT)
Age: 31
End: 2021-12-12